# Patient Record
Sex: FEMALE | Race: NATIVE HAWAIIAN OR OTHER PACIFIC ISLANDER | NOT HISPANIC OR LATINO | URBAN - METROPOLITAN AREA
[De-identification: names, ages, dates, MRNs, and addresses within clinical notes are randomized per-mention and may not be internally consistent; named-entity substitution may affect disease eponyms.]

---

## 2024-06-13 ENCOUNTER — EMERGENCY (EMERGENCY)
Facility: HOSPITAL | Age: 64
LOS: 1 days | Discharge: HOME | End: 2024-06-13
Attending: EMERGENCY MEDICINE
Payer: COMMERCIAL

## 2024-06-13 VITALS
OXYGEN SATURATION: 100 % | HEIGHT: 62 IN | HEART RATE: 88 BPM | TEMPERATURE: 98 F | DIASTOLIC BLOOD PRESSURE: 77 MMHG | RESPIRATION RATE: 16 BRPM | WEIGHT: 130.07 LBS | SYSTOLIC BLOOD PRESSURE: 158 MMHG

## 2024-06-13 PROCEDURE — 99285 EMERGENCY DEPT VISIT HI MDM: CPT

## 2024-06-14 LAB
ALBUMIN SERPL ELPH-MCNC: 3.6 G/DL — SIGNIFICANT CHANGE UP (ref 3.3–5)
ALBUMIN SERPL ELPH-MCNC: 4.2 G/DL — SIGNIFICANT CHANGE UP (ref 3.3–5)
ALP SERPL-CCNC: 85 U/L — SIGNIFICANT CHANGE UP (ref 40–120)
ALP SERPL-CCNC: 93 U/L — SIGNIFICANT CHANGE UP (ref 40–120)
ALT FLD-CCNC: 18 U/L — SIGNIFICANT CHANGE UP (ref 10–45)
ALT FLD-CCNC: 19 U/L — SIGNIFICANT CHANGE UP (ref 10–45)
ANION GAP SERPL CALC-SCNC: 11 MMOL/L — SIGNIFICANT CHANGE UP (ref 5–17)
ANION GAP SERPL CALC-SCNC: 15 MMOL/L — SIGNIFICANT CHANGE UP (ref 5–17)
APTT BLD: 30.9 SEC — SIGNIFICANT CHANGE UP (ref 24.5–35.6)
AST SERPL-CCNC: 24 U/L — SIGNIFICANT CHANGE UP (ref 10–40)
AST SERPL-CCNC: 34 U/L — SIGNIFICANT CHANGE UP (ref 10–40)
BASOPHILS # BLD AUTO: 0.01 K/UL — SIGNIFICANT CHANGE UP (ref 0–0.2)
BASOPHILS NFR BLD AUTO: 0.2 % — SIGNIFICANT CHANGE UP (ref 0–2)
BILIRUB SERPL-MCNC: 0.2 MG/DL — SIGNIFICANT CHANGE UP (ref 0.2–1.2)
BILIRUB SERPL-MCNC: 0.4 MG/DL — SIGNIFICANT CHANGE UP (ref 0.2–1.2)
BUN SERPL-MCNC: 10 MG/DL — SIGNIFICANT CHANGE UP (ref 7–23)
BUN SERPL-MCNC: 11 MG/DL — SIGNIFICANT CHANGE UP (ref 7–23)
CALCIUM SERPL-MCNC: 8.6 MG/DL — SIGNIFICANT CHANGE UP (ref 8.4–10.5)
CALCIUM SERPL-MCNC: 9.9 MG/DL — SIGNIFICANT CHANGE UP (ref 8.4–10.5)
CHLORIDE SERPL-SCNC: 103 MMOL/L — SIGNIFICANT CHANGE UP (ref 96–108)
CHLORIDE SERPL-SCNC: 111 MMOL/L — HIGH (ref 96–108)
CO2 SERPL-SCNC: 20 MMOL/L — LOW (ref 22–31)
CO2 SERPL-SCNC: 21 MMOL/L — LOW (ref 22–31)
CREAT SERPL-MCNC: 0.61 MG/DL — SIGNIFICANT CHANGE UP (ref 0.5–1.3)
CREAT SERPL-MCNC: 0.8 MG/DL — SIGNIFICANT CHANGE UP (ref 0.5–1.3)
EGFR: 82 ML/MIN/1.73M2 — SIGNIFICANT CHANGE UP
EGFR: 99 ML/MIN/1.73M2 — SIGNIFICANT CHANGE UP
EOSINOPHIL # BLD AUTO: 0.09 K/UL — SIGNIFICANT CHANGE UP (ref 0–0.5)
EOSINOPHIL NFR BLD AUTO: 1.6 % — SIGNIFICANT CHANGE UP (ref 0–6)
GLUCOSE SERPL-MCNC: 138 MG/DL — HIGH (ref 70–99)
GLUCOSE SERPL-MCNC: 99 MG/DL — SIGNIFICANT CHANGE UP (ref 70–99)
HCT VFR BLD CALC: 36.1 % — SIGNIFICANT CHANGE UP (ref 34.5–45)
HGB BLD-MCNC: 11.2 G/DL — LOW (ref 11.5–15.5)
IMM GRANULOCYTES NFR BLD AUTO: 0.2 % — SIGNIFICANT CHANGE UP (ref 0–0.9)
INR BLD: 1.03 RATIO — SIGNIFICANT CHANGE UP (ref 0.85–1.18)
LYMPHOCYTES # BLD AUTO: 2.09 K/UL — SIGNIFICANT CHANGE UP (ref 1–3.3)
LYMPHOCYTES # BLD AUTO: 38.2 % — SIGNIFICANT CHANGE UP (ref 13–44)
MCHC RBC-ENTMCNC: 25.7 PG — LOW (ref 27–34)
MCHC RBC-ENTMCNC: 31 GM/DL — LOW (ref 32–36)
MCV RBC AUTO: 83 FL — SIGNIFICANT CHANGE UP (ref 80–100)
MONOCYTES # BLD AUTO: 0.49 K/UL — SIGNIFICANT CHANGE UP (ref 0–0.9)
MONOCYTES NFR BLD AUTO: 9 % — SIGNIFICANT CHANGE UP (ref 2–14)
NEUTROPHILS # BLD AUTO: 2.78 K/UL — SIGNIFICANT CHANGE UP (ref 1.8–7.4)
NEUTROPHILS NFR BLD AUTO: 50.8 % — SIGNIFICANT CHANGE UP (ref 43–77)
NRBC # BLD: 0 /100 WBCS — SIGNIFICANT CHANGE UP (ref 0–0)
PLATELET # BLD AUTO: 258 K/UL — SIGNIFICANT CHANGE UP (ref 150–400)
POTASSIUM SERPL-MCNC: 3.8 MMOL/L — SIGNIFICANT CHANGE UP (ref 3.5–5.3)
POTASSIUM SERPL-MCNC: 5.7 MMOL/L — HIGH (ref 3.5–5.3)
POTASSIUM SERPL-SCNC: 3.8 MMOL/L — SIGNIFICANT CHANGE UP (ref 3.5–5.3)
POTASSIUM SERPL-SCNC: 5.7 MMOL/L — HIGH (ref 3.5–5.3)
PROT SERPL-MCNC: 6.2 G/DL — SIGNIFICANT CHANGE UP (ref 6–8.3)
PROT SERPL-MCNC: 8 G/DL — SIGNIFICANT CHANGE UP (ref 6–8.3)
PROTHROM AB SERPL-ACNC: 11.3 SEC — SIGNIFICANT CHANGE UP (ref 9.5–13)
RBC # BLD: 4.35 M/UL — SIGNIFICANT CHANGE UP (ref 3.8–5.2)
RBC # FLD: 14.1 % — SIGNIFICANT CHANGE UP (ref 10.3–14.5)
SODIUM SERPL-SCNC: 139 MMOL/L — SIGNIFICANT CHANGE UP (ref 135–145)
SODIUM SERPL-SCNC: 142 MMOL/L — SIGNIFICANT CHANGE UP (ref 135–145)
WBC # BLD: 5.47 K/UL — SIGNIFICANT CHANGE UP (ref 3.8–10.5)
WBC # FLD AUTO: 5.47 K/UL — SIGNIFICANT CHANGE UP (ref 3.8–10.5)

## 2024-06-14 PROCEDURE — 70551 MRI BRAIN STEM W/O DYE: CPT | Mod: 26,MC

## 2024-06-14 PROCEDURE — 70498 CT ANGIOGRAPHY NECK: CPT | Mod: 26,MC

## 2024-06-14 PROCEDURE — 70496 CT ANGIOGRAPHY HEAD: CPT | Mod: 26,MC

## 2024-06-14 PROCEDURE — 99223 1ST HOSP IP/OBS HIGH 75: CPT

## 2024-06-14 RX ORDER — ONDANSETRON 8 MG/1
4 TABLET, FILM COATED ORAL ONCE
Refills: 0 | Status: COMPLETED | OUTPATIENT
Start: 2024-06-14 | End: 2024-06-14

## 2024-06-14 RX ORDER — SODIUM CHLORIDE 9 MG/ML
1000 INJECTION INTRAMUSCULAR; INTRAVENOUS; SUBCUTANEOUS ONCE
Refills: 0 | Status: COMPLETED | OUTPATIENT
Start: 2024-06-14 | End: 2024-06-14

## 2024-06-14 RX ORDER — ASPIRIN/CALCIUM CARB/MAGNESIUM 324 MG
81 TABLET ORAL ONCE
Refills: 0 | Status: COMPLETED | OUTPATIENT
Start: 2024-06-14 | End: 2024-06-14

## 2024-06-14 RX ORDER — DIAZEPAM 5 MG
5 TABLET ORAL ONCE
Refills: 0 | Status: DISCONTINUED | OUTPATIENT
Start: 2024-06-14 | End: 2024-06-14

## 2024-06-14 RX ORDER — MECLIZINE HCL 12.5 MG
25 TABLET ORAL ONCE
Refills: 0 | Status: COMPLETED | OUTPATIENT
Start: 2024-06-14 | End: 2024-06-14

## 2024-06-14 RX ORDER — METOCLOPRAMIDE HCL 10 MG
10 TABLET ORAL ONCE
Refills: 0 | Status: COMPLETED | OUTPATIENT
Start: 2024-06-14 | End: 2024-06-14

## 2024-06-14 RX ADMIN — SODIUM CHLORIDE 1000 MILLILITER(S): 9 INJECTION INTRAMUSCULAR; INTRAVENOUS; SUBCUTANEOUS at 04:20

## 2024-06-14 RX ADMIN — Medication 81 MILLIGRAM(S): at 08:29

## 2024-06-14 RX ADMIN — ONDANSETRON 4 MILLIGRAM(S): 8 TABLET, FILM COATED ORAL at 04:20

## 2024-06-14 RX ADMIN — Medication 25 MILLIGRAM(S): at 04:19

## 2024-06-14 RX ADMIN — Medication 10 MILLIGRAM(S): at 08:28

## 2024-06-14 RX ADMIN — SODIUM CHLORIDE 1000 MILLILITER(S): 9 INJECTION INTRAMUSCULAR; INTRAVENOUS; SUBCUTANEOUS at 08:29

## 2024-06-14 RX ADMIN — Medication 5 MILLIGRAM(S): at 08:29

## 2024-06-14 NOTE — ED PROVIDER NOTE - PHYSICAL EXAMINATION
GEN: NAD, awake, eyes open spontaneously  EYES: normal conjunctiva, perrl, EOMI   ENT: NCAT, mildly dry MM, Trachea midline  CHEST/LUNGS: Non-tachypneic, CTAB, bilateral breath sounds  CARDIAC: Non-tachycardic, normal perfusion  ABDOMEN: Soft, NTND, No rebound/guarding  MSK: No edema, no gross deformity of extremities  SKIN: No rashes, no petechiae, no vesicles  NEURO: Acutely dizzy when sitting up in stretcher, CN grossly intact, abnormal left sided finger-to-nose > right, no focal motor or sensory deficits  PSYCH: Alert, appropriate, cooperative, with capacity and insight

## 2024-06-14 NOTE — ED CDU PROVIDER INITIAL DAY NOTE - PHYSICAL EXAMINATION
CONSTITUTIONAL: Well appearing and in no apparent distress.  ENT: Airway patent, moist mucous membranes.   EYES: Pupils equal, round and reactive to light. EOMI. Conjunctiva normal appearing.  +Horizontal nystagmus.  CARDIAC: Normal rate, regular rhythm.  Heart sounds S1, S2.    RESPIRATORY: Breath sounds clear and equal bilaterally.   GASTROINTESTINAL: Abdomen soft, non-tender, not distended.  MUSCULOSKELETAL: Spine appears normal.  NEUROLOGICAL: Alert and oriented x3, no facial droop. Negative pronator drift, abnormal FTN left>right. Speech clear. 5/5 muscle strength throughout. Gait steady.

## 2024-06-14 NOTE — CONSULT NOTE ADULT - ASSESSMENT
64 RHF with hx of GERD, HLD, osteoporosis presents to the ED complaining of acute onset dizziness 2 days ago. Pt explained that onset started Wednesday afternoon sudden onset walking to the restroom. Had nausea, room spinning dizziness, vomit x3. Dizziness had been intermittent and brought on with position change. Each onset occurs with NV and unable to ambulate. Denied tinnitus but had right ear itchiness for the past for years. Denied weakness, numbness, facial droop, change in speech.     While in the ED pt s/p fluids x2 and meclizine x1 followed by Valium which improved symptoms. Currently asymptomatic.     ABCD2 score 3    CTH and CTA grossly unremarkable.      Impression: Acute vestibular syndrome with likely pain limited finger to nose and heal to shin testing i/s/o osteoperosis vs central process. CTA with reassuring patent vertebrobasilar circulation. Would obtain MRI brain to further r/o a central process     Recommendations:     Plan  [x] CT/CTA as above reviewed  [] EKG to evaluate for arrhythmias  [] MRI brain w/o contrast to r/o neurovascular event  [] Start aspirin 81 mg now for stroke prevention. IF MRI negative can dc  [] If MRI positive obtain Echo w/o bubble inpatient vs outpatient   [] Goal normotension  [] PRN IVFs  [] Meclizine 25mg BID PRN (can increase to 50mg Q8)  [] Ondansetron 8mg up to q8hr PRN OR Reglan 4mg PRN q8h PRN for N/V  [] Can try Clonzepam 0.5mg then Q8H PRN (for 2 to 3 days)    Other:  [] Refer for Vestibular Rehab on discharge  [] Epley manuever print out  [] ENT f/u outpatient  [] Neurology f/u outpatient:  Patient can follow up with Dr. Orlando Chopra, Dr. Michael Nissenbaum or Dr. Lacho Hull after discharge. Please instruct the patient/family to call [phone] to schedule an appointment within the next 1-2 weeks. Office is located at [address] [place] [address].     Patient case discussed with stroke fellow Dr. Connor Fitzgerald and Dr. Brandon Yates under the supervision of stroke attending Dr. Kitchen .  Final recommendations regarding management to be confirmed upon attending attestation.     ** Case to be seen by neurology attending during AM rounds

## 2024-06-14 NOTE — CONSULT NOTE ADULT - SUBJECTIVE AND OBJECTIVE BOX
Neurology - Consult Note    -  Spectra: [phone] ([facility]), [phone] ([facility])  -    HPI: Patient KAMILLA FELIZ is a 64 RHF with hx of GERD, HLD, osteoporosis presents to the ED complaining of acute onset dizziness 2 days ago. Pt explained that onset started Wednesday afternoon sudden onset walking to the restroom. Had nausea, room spinning dizziness, vomit x3. Dizziness had been intermittent and brought on with position change. Each onset occurs with NV and unable to ambulate. Denied tinnitus but had right ear itchiness for the past for years. Denied weakness, numbness, facial droop, change in speech.     While in the ED pt s/p fluids x2 and meclizine x1 followed by Valium which improved symptoms. Currently asymptomatic.     ABCD2 score 3    CTH and CTA grossly unremarkable.      Review of Systems:     CONSTITUTIONAL: No fevers or chills  EYES AND ENT: No visual changes or no throat pain   NECK: No pain or stiffness  RESPIRATORY: No hemoptysis or shortness of breath  CARDIOVASCULAR: No chest pain or palpitations  GASTROINTESTINAL: No melena or hematochezia  GENITOURINARY: No dysuria or hematuria  NEUROLOGICAL: +As stated in HPI above  SKIN: No itching, burning, rashes, or lesions   All other review of systems is negative unless indicated above.    Allergies:  penicillin (Rash)  shellfish (Urticaria)      PMHx/PSHx/Family Hx: As above, otherwise see below   HLD (hyperlipidemia)    Gastritis    Osteoporosis        Social Hx:  No reported use of tobacco, alcohol, or illicit drugs    Medications:  MEDICATIONS  (STANDING):    MEDICATIONS  (PRN):        Home Medications:      Vitals:  T(C): 37 (06-14-24 @ 11:45), Max: 37 (06-14-24 @ 11:45)  HR: 78 (06-14-24 @ 11:45) (70 - 88)  BP: 127/81 (06-14-24 @ 11:45) (120/67 - 168/77)  RR: 16 (06-14-24 @ 11:45) (16 - 18)  SpO2: 97% (06-14-24 @ 11:45) (97% - 100%)    Physical Examination:    General - NAD  Cardiovascular - Peripheral pulses palpable, no edema    Neurologic Exam:  Mental status - Awake, Alert, Oriented to person, place, and time. Speech fluent, repetition and naming intact. Follows simple and complex commands. attention, concentration and fund of knowledge intact.     Cranial nerves:  CN II: Visual fields are full to confrontation. Pupils are 3 mm and briskly reactive to light.   CN III, IV, VI: EOMI,  L beating nystagmus in both directions (L>R) on HINTS, no ptosis  CN V: Facial sensation is intact to pinprick in all 3 divisions bilaterally.  CN VII: Face is symmetric with normal eye closure and smile.  CN VII: Hearing is normal to rubbing fingers  CN IX, X: Palate elevates symmetrically. Phonation is normal.  CN XI: Head turning and shoulder shrug are intact  CN XII: Tongue is midline with normal movements and no atrophy.       Motor - Normal bulk and tone throughout. No pronator drift.  Strength testing            Deltoid      Biceps      Triceps     Wrist Extension    Wrist Flexion       R            5                 5               5                     5                              5                        5  L             5                 5               5                     5                              5                       5              Hip Flexion    Hip Extension    Knee Flexion    Knee Extension    Dorsiflexion    Plantar Flexion  R              5                           5                       5                           5                            5                          5  L              5                           5                        5                           5                            5                          5    neg hoffmans b/l    Sensation - Light touch/temperature intact throughout    DTR's -             Biceps      Triceps     Brachioradialis      Patellar    Ankle    Toes/plantar response  R             2+             2+                  2+                       2+            2+                 Down  L              2+             2+                 2+                        2+           2+                 Down    Coordination - slowed  Finger to Nose and heal to shin b/l ?dysmetria however confounded by osteoporosis pain. No tremors appreciated    Gait and station - Initial exam Able to only take two steps with one person assist. Second exam able to walk without one person assist with cautious and non ataxic gait    Labs:                        11.2   5.47  )-----------( 258      ( 14 Jun 2024 03:58 )             36.1     06-14    139  |  103  |  10  ----------------------------<  99  5.7<H>   |  21<L>  |  0.61    Ca    9.9      14 Jun 2024 03:58    TPro  8.0  /  Alb  4.2  /  TBili  0.4  /  DBili  x   /  AST  34  /  ALT  18  /  AlkPhos  93  06-14    CAPILLARY BLOOD GLUCOSE        LIVER FUNCTIONS - ( 14 Jun 2024 03:58 )  Alb: 4.2 g/dL / Pro: 8.0 g/dL / ALK PHOS: 93 U/L / ALT: 18 U/L / AST: 34 U/L / GGT: x             PT/INR - ( 14 Jun 2024 03:58 )   PT: 11.3 sec;   INR: 1.03 ratio         PTT - ( 14 Jun 2024 03:58 )  PTT:30.9 sec      Radiology:    IMPRESSION:    CT HEAD: No acute hemorrhage, vasogenic edema, extra-axial collection or   hydrocephalus.    CT ANGIOGRAPHY NECK: Patent cervical vasculature.  No hemodynamically   significant carotid stenosis or flow-limiting vertebral artery stenosis.    No evidence of dissection.    CT ANGIOGRAPHY BRAIN: No vessel occlusion, flow-limiting stenosis or   aneurysm.

## 2024-06-14 NOTE — ED ADULT NURSE NOTE - NSFALLHARMRISKINTERV_ED_ALL_ED
Assistance OOB with selected safe patient handling equipment if applicable/Assistance with ambulation/Communicate risk of Fall with Harm to all staff, patient, and family/Encourage patient to sit up slowly, dangle for a short time, stand at bedside before walking/Monitor gait and stability/Orthostatic vital signs/Provide patient with walking aids/Provide visual cue: red socks, yellow wristband, yellow gown, etc/Reinforce activity limits and safety measures with patient and family/Bed in lowest position, wheels locked, appropriate side rails in place/Call bell, personal items and telephone in reach/Instruct patient to call for assistance before getting out of bed/chair/stretcher/Non-slip footwear applied when patient is off stretcher/[place] to call system/Physically safe environment - no spills, clutter or unnecessary equipment/Purposeful Proactive Rounding/Room/bathroom lighting operational, light cord in reach

## 2024-06-14 NOTE — ED CDU PROVIDER DISPOSITION NOTE - ATTENDING APP SHARED VISIT CONTRIBUTION OF CARE
The patient was serially evaluated throughout emergency department course by the team. There was no acute deterioration up to this time in the emergency department. The patient has demonstrated clinical improvement and/or stability, feels better at this time according to emergency department team. Agree with goals/plan of emergency department care as described in this physician's electronic medical record, including diagnostics, therapeutics and consultation recommendation as clinically warranted. Will discharge home with close outpatient follow up with primary care physician/provider and specialist if necessary. The patient and/or family was educated on expectant management and return precautions concerning signs and features to return to the emergency department, in layman terms, including but not limited to: nausea, vomiting, fever, chills, the inability to eat, take medications, or drink, persistent/worsening symptoms or any concerns at all. There are no acute or immediate life threatening issues present on history, clinical exam, or any diagnostic evaluation. The patient is a safe disposition home, has capacity and insight into their condition, is ambulatory in the Emergency Department with no further questions and will follow up with their doctor(s) this week. Diagnosis, prognosis, natural history and treatment was discussed with patient and/or family. The patient and/or family were given the opportunity to ask questions and have them answered in full. The patient and/or family are with capacity and insight into the situation, treatment, risks, benefits, alternative therapies, and understand that they can ask any further questions if needed. Patient and/or family/guardian understands anticipatory guidance and was given strict return and follow up precautions. The patient and/or family/guardian has been informed of the necessity to follow up with the PMD/Clinic/follow up as provided within 2-3 days, and the patient and/or family/guardian reports understanding of above with capacity and insight. The patient and/or family/guardian were informed of any results of their tests and are were encouraged to follow up on the findings with their doctor as well as the need to inform their doctor of any results. All available results endorsed including unexpected incidental findings (copy of reports provided to patient). The patient and/or family/guardian are aware of the need to follow up with repeat testing as applicable and report understanding of the above with capacity and insight. The patient and/or family/guardain was made aware of any pending test results at the time of discharge and of the need to call back for the final results as well as the need to inform their doctor of the results.

## 2024-06-14 NOTE — ED CDU PROVIDER DISPOSITION NOTE - CLINICAL COURSE
63 yo F with a PMH of GERD, HLD, osteoporosis p/w acute onset dizziness 2 days ago. Pt explained that on Wednesday afternoon she developed a sudden onset of sxs while walking to the restroom. Dizziness described as room spinning and a/w nausea and vomiting x 3. Now she states dizziness triggered by position changes. Has been so symptomatic that she has been unable to walk today 2/2 feeling off balanced. Denies fever, chills, chest pain, SOB, headache, changes in vision/speech, paresthesias, facial droop, extremity weakness.  In the ED, pt w/ stable VS. Labs non actionable, CTA head/neck w/o acute findings. S/p fluids x2 and meclizine x1 followed by Valium which improved symptoms. Currently asymptomatic. Pt was seen by neuro, recommending MRI brain to r/o central pathology and cntd neuro checks.  In the CDU*** 63 yo F with a PMH of GERD, HLD, osteoporosis p/w acute onset dizziness 2 days ago. Pt explained that on Wednesday afternoon she developed a sudden onset of sxs while walking to the restroom. Dizziness described as room spinning and a/w nausea and vomiting x 3. Now she states dizziness triggered by position changes. Has been so symptomatic that she has been unable to walk today 2/2 feeling off balanced. Denies fever, chills, chest pain, SOB, headache, changes in vision/speech, paresthesias, facial droop, extremity weakness.  In the ED, pt w/ stable VS. Labs hemolyzed, pending repeat. CTA head/neck w/o acute findings. S/p fluids x2 and meclizine x1 followed by Valium which improved symptoms. Currently asymptomatic. Pt was seen by neuro, recommending MRI brain to r/o central pathology and cntd neuro checks.  In the CDU*** 65 yo F with a PMH of GERD, HLD, osteoporosis p/w acute onset dizziness 2 days ago. Pt explained that on Wednesday afternoon she developed a sudden onset of sxs while walking to the restroom. Dizziness described as room spinning and a/w nausea and vomiting x 3. Now she states dizziness triggered by position changes. Has been so symptomatic that she has been unable to walk today 2/2 feeling off balanced. Denies fever, chills, chest pain, SOB, headache, changes in vision/speech, paresthesias, facial droop, extremity weakness.  In the ED, pt w/ stable VS. Labs hemolyzed, pending repeat. CTA head/neck w/o acute findings. S/p fluids x2 and meclizine x1 followed by Valium which improved symptoms. Currently asymptomatic. Pt was seen by neuro, recommending MRI brain to r/o central pathology and cntd neuro checks.  In the CDU*** resting. stable vitals. MRI with NO Acute pathology. ambulatory without symx and tolerating PO. pt to follow up with Neuro Outpt and rx for Meclizine sent to the pharmacy. Case discussed with juan manuel Mendenhall for discharge.

## 2024-06-14 NOTE — ED ADULT NURSE NOTE - BEFAST SPEECH PHRASE
LVMTCB to scheduled follow up appt with Dr. Bernheim for further refills. Let her know that she can also request this appt via pt portal.   
Please assist by reaching out to patient to schedule an appointment with PCP  
Yes

## 2024-06-14 NOTE — ED CDU PROVIDER DISPOSITION NOTE - NSFOLLOWUPINSTRUCTIONS_ED_ALL_ED_FT
Please make sure to follow up with your primary care doctor within 1-2 days and with the NEUROLOGY specialist. The information for follow up can be found below. Bring a copy of all of your results with you to your follow up appointments.   Return to the ER as discussed if you develop any new or worsening symptoms.     NEUROLOGY:      Take MECLIZINE as directed. Make sure to adequately hydrate. Please make sure to follow up with your primary care doctor within 1-2 days and with the NEUROLOGY specialist. The information for follow up can be found below. Bring a copy of all of your results with you to your follow up appointments.     Return to the ER as discussed if you develop any new or worsening symptoms.     Meclizine 25mg every 8 hours as needed for dizziness. Neurology follow up [phone].     Make sure to adequately hydrate.

## 2024-06-14 NOTE — ED ADULT NURSE NOTE - OBJECTIVE STATEMENT
Pt is a 65y/o F arriving to [facility] ED from triage c/o nausea. Pt endorsing 4x episode of nausea today, onset of symptoms began on Wednesday. Pt reports on Wednesday at 2pm episode of "vertigo, dizziness nausea and vomiting", since incidence experience waxing and waning N/V with dizziness. C/o of some abdominal discomfort due to inability to keep down food/water. Upon physical assessment, neuro check performed and documented in flow sheet. PMH ovarian cysts and gastritis, reports has "not been taking medication for gastritis at home". Pt is A&Ox4 currently denying any visual deficits, SOB, cough, CP, palpitations,  urinary symptoms, fever/chills. Pt ambulates independently at baseline. Bedrail's up and comfort measures provided

## 2024-06-14 NOTE — ED PROVIDER NOTE - PROGRESS NOTE DETAILS
Ania Reed,  (PGY-1): CTs negative. Patient still unable to stand/ambulate due to dizziness. Will consult neuro. Marianela Guido PGY1: re-assessed patient. very unsteady just with standing up. pt continues to feel dizzy.  will continue to monitor. Marianela Guido PGY1: patient able to ambulate. Per neuro recs, CDU for MRI

## 2024-06-14 NOTE — ED PROVIDER NOTE - CLINICAL SUMMARY MEDICAL DECISION MAKING FREE TEXT BOX
64F with hx of HLD presents for 2 days of room spinning dizziness, positional in nature, with nausea, vomiting, and difficulty ambulating. Exam with abnormal finger-to-nose coordination, otherwise no other focal neuro deficits. Will obtain CTA head/neck to eval for central cause of dizziness, as well as labs to eval for anemia vs. metabolic derangement. treat symptomatically with Meclizine, Zofran, fluids, and reassess.

## 2024-06-14 NOTE — ED CDU PROVIDER INITIAL DAY NOTE - OBJECTIVE STATEMENT
63 yo F with a PMH of GERD, HLD, osteoporosis p/w acute onset dizziness 2 days ago. Pt explained that on Wednesday afternoon she developed a sudden onset of sxs while walking to the restroom. Dizziness described as room spinning and a/w nausea and vomiting x 3. Now she states dizziness triggered by position changes. Has been so symptomatic that she has been unable to walk today 2/2 feeling off balanced. Denies fever, chills, chest pain, SOB, headache, changes in vision/speech, paresthesias, facial droop, extremity weakness.

## 2024-06-14 NOTE — ED PROVIDER NOTE - OBJECTIVE STATEMENT
64F with hx of GERD, HLD, osteoporosis presents to the ED complaining of acute onset dizziness 2 days ago. Described as room spinning, acutely worsens when sitting up or standing. Tolerates lying down. Endorses difficulty ambulating due to dizziness, states he has to hold onto things to prevent falling. Associated nausea and vomiting, 3-4 episodes per day. Denies headache, vision changes, neck pain, chest pain, sob, abdominal pain. States she had similar symptoms in February that lasted for 1 day but then resolved on their own. No meds at home for symptoms.

## 2024-06-14 NOTE — ED CDU PROVIDER DISPOSITION NOTE - PATIENT PORTAL LINK FT
You can access the FollowMyHealth Patient Portal offered by [facility] by registering at the following website: [email or web address]. By joining [employer]’s FollowMyHealth portal, you will also be able to view your health information using other applications (apps) compatible with our system.

## 2024-06-14 NOTE — ED PROVIDER NOTE - ATTENDING CONTRIBUTION TO CARE
Patient presents with several days of dizziness made worse by certain movements, leading to loss of balance.  She also endorses tingling of her right hand.  Patient was evaluated at urgent care and was sent in due to an abnormal finger-to-nose test.  On exam patient is in no distress, however becomes symptomatic with shifting positions in bed.  She does have bilateral mild dysmetria on finger-to-nose worse on the right than the left, and a delayed heel-to-shin right greater than left.  Otherwise she has a nonfocal neurologic exam.  Due to abnormal neurologic findings and sudden onset dizziness, there is concern about cerebellar pathology.  Patient receive CTA which shows no acute process.  We will consult neurology due to atypical findings and persistent dizziness.  Labs nonactionable.

## 2024-06-15 VITALS
SYSTOLIC BLOOD PRESSURE: 121 MMHG | HEART RATE: 62 BPM | RESPIRATION RATE: 18 BRPM | OXYGEN SATURATION: 99 % | DIASTOLIC BLOOD PRESSURE: 69 MMHG | TEMPERATURE: 98 F

## 2024-06-15 LAB
A1C WITH ESTIMATED AVERAGE GLUCOSE RESULT: 5.7 % — HIGH (ref 4–5.6)
CHOLEST SERPL-MCNC: 204 MG/DL — HIGH
ESTIMATED AVERAGE GLUCOSE: 117 MG/DL — HIGH (ref 68–114)
HDLC SERPL-MCNC: 69 MG/DL — SIGNIFICANT CHANGE UP
LIPID PNL WITH DIRECT LDL SERPL: 130 MG/DL — HIGH
NON HDL CHOLESTEROL: 135 MG/DL — HIGH
TRIGL SERPL-MCNC: 29 MG/DL — SIGNIFICANT CHANGE UP

## 2024-06-15 PROCEDURE — 99285 EMERGENCY DEPT VISIT HI MDM: CPT | Mod: 25

## 2024-06-15 PROCEDURE — 99238 HOSP IP/OBS DSCHRG MGMT 30/<: CPT

## 2024-06-15 PROCEDURE — 85610 PROTHROMBIN TIME: CPT

## 2024-06-15 PROCEDURE — 70496 CT ANGIOGRAPHY HEAD: CPT | Mod: MC

## 2024-06-15 PROCEDURE — 83036 HEMOGLOBIN GLYCOSYLATED A1C: CPT

## 2024-06-15 PROCEDURE — 36415 COLL VENOUS BLD VENIPUNCTURE: CPT

## 2024-06-15 PROCEDURE — 93005 ELECTROCARDIOGRAM TRACING: CPT

## 2024-06-15 PROCEDURE — 85730 THROMBOPLASTIN TIME PARTIAL: CPT

## 2024-06-15 PROCEDURE — G0378: CPT

## 2024-06-15 PROCEDURE — 70498 CT ANGIOGRAPHY NECK: CPT | Mod: MC

## 2024-06-15 PROCEDURE — 70551 MRI BRAIN STEM W/O DYE: CPT | Mod: MC

## 2024-06-15 PROCEDURE — 96374 THER/PROPH/DIAG INJ IV PUSH: CPT | Mod: XU

## 2024-06-15 PROCEDURE — 80053 COMPREHEN METABOLIC PANEL: CPT

## 2024-06-15 PROCEDURE — 80061 LIPID PANEL: CPT

## 2024-06-15 PROCEDURE — 85025 COMPLETE CBC W/AUTO DIFF WBC: CPT

## 2024-06-15 RX ORDER — MECLIZINE HCL 12.5 MG
1 TABLET ORAL
Qty: 15 | Refills: 0
Start: 2024-06-15

## 2024-06-15 NOTE — ED CDU PROVIDER SUBSEQUENT DAY NOTE - ATTENDING APP SHARED VISIT CONTRIBUTION OF CARE
Attending Emergency Physician- Dorian Bro MD, FACEP .  See observation monitoring plan section from initial day note.

## 2024-06-15 NOTE — ED CDU PROVIDER SUBSEQUENT DAY NOTE - PROGRESS NOTE DETAILS
* resting. stable vitals. MRI with NO Acute pathology. ambulatory without symx and tolerating PO. pt to follow up with Neuro Outpt and rx for Meclizine sent to the pharmacy. Case discussed with Dr. Alberto, stable for discharge.

## 2024-06-15 NOTE — ED ADULT NURSE REASSESSMENT NOTE - NSFALLHARMRISKINTERV_ED_ALL_ED

## 2024-06-15 NOTE — CHART NOTE - NSCHARTNOTEFT_GEN_A_CORE
Interim events:  MRI brain obtained, report as follows:  FINDINGS:  BRAIN:   The brain demonstrates no abnormal signal intensity.  No   diffusion restriction is found in the brain.  No acute cerebral cortical   infarct is found.   No intracranial hemorrhage is recognized.  No mass   effect is found in the brain.  CSF SPACES:   The ventricles, sulci and basal cisterns appear mildly   dilated reflecting diffuse brain volume loss.   The internal auditory   canals appear intact, without osseous expansion or soft tissue mass   lesion.The 7th and 8th cranial nerves appear intact, allowing for this   technique.   Inner ear structures appear normally formed.  VESSELS:   The vertebral and internal carotid arteries demonstrate   expected flow voids indicating their patency.  The left vertebral artery   is dominant caliber.  HEAD AND NECK STRUCTURES:   The orbits are unremarkable.  Paranasal   sinuses are clear.  The nasal cavity appears intact.  The central skull   base appears intact.  The nasopharynx is symmetric.  The temporal bones   appear clear of disease.  The petrous apices are asymmetric, with   asymmetric air on the right and marrow on the left, without bone   destruction and typical for incidental developmental variation. The   calvarium appears unremarkable.  IMPRESSION:  Unremarkable MR of the brain. No evidence of infarction    Impression: Acute vestibular syndrome, MR brain negative.    Recommendations  [] given MR findings from neurovascular standpoint would not require ASA 81 daily, can d/c  [] Meclizine 12.5mg BID PRN   [] Reglan 4mg  PRN Q8H for nausea  [] Refer for Vestibular Rehab on discharge  [] ENT eval on discharge  [] Neurology f/u outpatient, can be seen by general neurology at [address] [place] [address] [address] t:. [phone] please call for an appointment,  -If insurance is problem, can follow up with resident clinic at [address] [place] [address]. Can call [phone] for an appointment; would request date for when epileptologist is on service    Finalized upon attending attestation. Interim events:  MRI brain obtained, report as follows:  FINDINGS:  BRAIN:   The brain demonstrates no abnormal signal intensity.  No   diffusion restriction is found in the brain.  No acute cerebral cortical   infarct is found.   No intracranial hemorrhage is recognized.  No mass   effect is found in the brain.  CSF SPACES:   The ventricles, sulci and basal cisterns appear mildly   dilated reflecting diffuse brain volume loss.   The internal auditory   canals appear intact, without osseous expansion or soft tissue mass   lesion.The 7th and 8th cranial nerves appear intact, allowing for this   technique.   Inner ear structures appear normally formed.  VESSELS:   The vertebral and internal carotid arteries demonstrate   expected flow voids indicating their patency.  The left vertebral artery   is dominant caliber.  HEAD AND NECK STRUCTURES:   The orbits are unremarkable.  Paranasal   sinuses are clear.  The nasal cavity appears intact.  The central skull   base appears intact.  The nasopharynx is symmetric.  The temporal bones   appear clear of disease.  The petrous apices are asymmetric, with   asymmetric air on the right and marrow on the left, without bone   destruction and typical for incidental developmental variation. The   calvarium appears unremarkable.  IMPRESSION:  Unremarkable MR of the brain. No evidence of infarction    Impression: Acute vestibular syndrome, MR brain negative.    Recommendations  [] given MR findings from neurovascular standpoint would not require ASA 81 daily, can d/c  [] Meclizine 12.5mg BID PRN   [] Reglan 4mg  PRN Q8H for nausea  [] Refer for Vestibular Rehab on discharge  [] ENT eval on discharge  [] Neurology f/u outpatient, can be seen by general neurology at [address] [place] [address] [address] t:. [phone] please call for an appointment,  -If insurance is problem, can follow up with resident clinic at [address] [place] [address]. Can call [phone] for an appointment    Finalized upon attending attestation.

## 2024-06-15 NOTE — ED CDU PROVIDER SUBSEQUENT DAY NOTE - HISTORY
No interval changes since initial CDU provider note. Pt without new complaint. NAD VSS. no events on tele. Pending MRI read in the setting of dizziness, neurology following. - CLAY So

## 2024-06-15 NOTE — ED CDU PROVIDER SUBSEQUENT DAY NOTE - CLINICAL SUMMARY MEDICAL DECISION MAKING FREE TEXT BOX
Patient improved, MRI reviewed without evidence of infarct, asymptomatic, reporting medications helped, likely vertigo pathology.

## 2024-06-15 NOTE — ED ADULT NURSE REASSESSMENT NOTE - NS ED NURSE REASSESS COMMENT FT1
Pt received from LUKE Kimble. Pt oriented to CDU & plan of care was discussed. Pt A&O x 4. Pt in CDU for neuro checks, MRI, symptomatic relief, neuro cs. Pt denies any lightheadedness, dizziness, headache, weakness, numbness, visual or speech disturbances. On neuro exam, A&O x 4, PERRLA, EOMI,  strength equal, sensation intact, clear speech. V/S stable, pt afebrile, IV in place, patent and free of signs of infiltration. Pt resting in bed. Safety & comfort measures maintained. Call bell in reach. Will continue to monitor.
Received report from night LUKE Fox. Upon assessment, A&Ox4, denies chest pain, SOB, abdominal pain, n/v. Vitals WNL. Awaiting neuro.
07.00 Received the Pt from  LUKE Pillai  Pt is Observed for Dizziness for neuro consult & Dispo. Received the Pt A&OX 4  Pt obeys commands Sasha N/V/D fever chills cp SOB .Comfort care & safety measures continued  IV site looks clean & dry no signs of infiltration noted pt denies  pain IV site .Pt is oriented to the unit Plan of care explained .Pt is advised to call for help  call bell with in the reach pt verbalized the understanding .pending CDU  MD crawford . GCS 15/15 A&OX 4 PERRLA  size 3 Strong upper & lower extremities steady gait  Pt is ambulatory & independent   No facial droop  No Hand Leg drop denies numbness tingling  Pt states she feels better with dizziness   Plan of care ongoing    08.00 AM  Pt got evaluated by CDU MD Dieudonne Alarcon

## 2024-06-19 NOTE — ED POST DISCHARGE NOTE - ADDITIONAL DOCUMENTATION
6/19/24 Cem BARRY pt called requesting work note showing that she was in hospital 6/13-6/15. return to work 6/20. will have note generated and at waiting room for .

## 2024-11-19 NOTE — ED ADULT TRIAGE NOTE - ARRIVAL FROM
----- Message from Rafita Reno MD sent at 11/19/2024  8:15 AM CST -----  Urinalysis came back normal without any sign of infection or urinary problems  
Informed patient of results. Verbalized understanding.  
Home
